# Patient Record
Sex: FEMALE | Race: WHITE
[De-identification: names, ages, dates, MRNs, and addresses within clinical notes are randomized per-mention and may not be internally consistent; named-entity substitution may affect disease eponyms.]

---

## 2024-05-14 ENCOUNTER — HOSPITAL ENCOUNTER (OUTPATIENT)
Dept: HOSPITAL 99 - RAD | Age: 34
End: 2024-05-14
Payer: COMMERCIAL

## 2024-05-14 DIAGNOSIS — R74.8: Primary | ICD-10-CM

## 2024-07-12 ENCOUNTER — TELEPHONE (OUTPATIENT)
Dept: GASTROENTEROLOGY | Facility: CLINIC | Age: 34
End: 2024-07-12

## 2024-07-12 ENCOUNTER — OFFICE VISIT (OUTPATIENT)
Dept: GASTROENTEROLOGY | Facility: CLINIC | Age: 34
End: 2024-07-12
Payer: COMMERCIAL

## 2024-07-12 VITALS
SYSTOLIC BLOOD PRESSURE: 109 MMHG | HEIGHT: 60 IN | DIASTOLIC BLOOD PRESSURE: 65 MMHG | BODY MASS INDEX: 21.48 KG/M2 | WEIGHT: 109.4 LBS

## 2024-07-12 DIAGNOSIS — R19.7 DIARRHEA, UNSPECIFIED TYPE: ICD-10-CM

## 2024-07-12 DIAGNOSIS — R10.84 GENERALIZED ABDOMINAL PAIN: Primary | ICD-10-CM

## 2024-07-12 PROCEDURE — 99244 OFF/OP CNSLTJ NEW/EST MOD 40: CPT | Performed by: INTERNAL MEDICINE

## 2024-07-12 RX ORDER — DICYCLOMINE HYDROCHLORIDE 10 MG/1
10 CAPSULE ORAL 2 TIMES DAILY
Qty: 60 CAPSULE | Refills: 2 | Status: SHIPPED | OUTPATIENT
Start: 2024-07-12

## 2024-07-12 RX ORDER — LEVONORGESTREL 19.5 MG/1
INTRAUTERINE DEVICE INTRAUTERINE
COMMUNITY

## 2024-07-12 RX ORDER — TRAZODONE HYDROCHLORIDE 50 MG/1
TABLET ORAL
COMMUNITY

## 2024-07-12 RX ORDER — ESCITALOPRAM OXALATE 10 MG/1
TABLET, FILM COATED ORAL
COMMUNITY

## 2024-07-12 RX ORDER — DEXTROAMPHETAMINE SULFATE, DEXTROAMPHETAMINE SACCHARATE, AMPHETAMINE SULFATE AND AMPHETAMINE ASPARTATE 6.25; 6.25; 6.25; 6.25 MG/1; MG/1; MG/1; MG/1
CAPSULE, EXTENDED RELEASE ORAL
COMMUNITY

## 2024-07-12 RX ORDER — AZITHROMYCIN 500 MG/1
500 TABLET, FILM COATED ORAL DAILY
COMMUNITY

## 2024-07-12 RX ORDER — DEXTROAMPHETAMINE SACCHARATE, AMPHETAMINE ASPARTATE, DEXTROAMPHETAMINE SULFATE AND AMPHETAMINE SULFATE 1.25; 1.25; 1.25; 1.25 MG/1; MG/1; MG/1; MG/1
TABLET ORAL
COMMUNITY
Start: 2024-06-19

## 2024-07-12 NOTE — PROGRESS NOTES
[facility] - Outpatient Consultation  Yudy Rodriguez 34 y.o. female MRN: [id number]  Encounter: [id number]    ASSESSMENT AND PLAN:      1. Generalized abdominal pain  This is a 34-year-old female referred to us by Dr. Henderson for 4 weeks of ongoing abdominal pain and diarrhea.  Patient states that this was started by when she had some hostile with her family.  No recent travel, antibiotics, change in meds leading up to this.    Sounds like postinfectious IBS.  Although given ongoing symptoms, may need to consider ongoing infection versus new onset celiac versus new onset IBD.  No significant family history.    For now we will start some Bentyl twice a day.  Will check stool studies and blood work.    - dicyclomine (BENTYL) 10 mg capsule; Take 1 capsule (10 mg total) by mouth 2 (two) times a day  Dispense: 60 capsule; Refill: 2    2. Diarrhea, unspecified type    -Dairy free diet for now    - Clostridium difficile toxin by PCR with EIA; Future  - Stool Enteric Bacterial Panel by PCR; Future  - CBC and differential; Future  - Calprotectin,Fecal; Future  - Celiac Disease Panel; Future  - C-reactive protein; Future    -Will plan on scheduling a colonoscopy about 4 weeks out.  I did encourage the patient if she feels better, she is okay to cancel it at that point.    - Colonoscopy; Future  - polyethylene glycol (COLYTE) 4000 mL solution; Take 4,000 mL by mouth once for 1 dose Take 4000 mL by mouth once for 1 dose. Use as directed  Dispense: 4000 mL; Refill: 0      Followup Appointment: 6 weeks  ______________________________________________________________________    Chief Complaint   Patient presents with   • Abdominal Pain     Started about a month ago, after eating pasta   • Diarrhea     Started a month ago and has been everyday after eating.        HPI:   Yudy Rodriguez is a 34 y.o. year old female who presents at the request of her PCP for abdominal pain and diarrhea.   Patient states that previously, she was overall doing well.  Occasionally she would get abdominal cramping and diarrhea when she had dairy.  Denies significant changes in meds, recent travel, antibiotics.  She had dinner with her family and had Posta georgii.  And ever since then, she has been having severe diarrhea.  Initially she was moving her bowels 4-6 times a day.  Now she has loose explosive bowel movements about 2-3 times a day.  No blood.  All associated with this she has severe lower abdominal cramping leading up to the diarrheal episode.  The cramping has improved some but still there.  She saw her primary who had some blood work done.  She had leukocytosis mildly.  Recently she was started on azithromycin.    No recent fevers or chills.  No other sick contacts.  She recently did get promoted at her job as a .  Admits that she does not eat regularly at baseline.    Historical Information   Past Medical History:   Diagnosis Date   • ADHD    • Anxiety    • Depression      Past Surgical History:   Procedure Laterality Date   • COMBINED REDUCTION MAMMAPLASTY W/ LIPOSUCTION       Social History     Substance and Sexual Activity   Alcohol Use Yes    Comment: socially     Social History     Substance and Sexual Activity   Drug Use Never     Social History     Tobacco Use   Smoking Status Never   Smokeless Tobacco Never     Family History   Problem Relation Age of Onset   • Hypertension Mother    • Diabetes type II Mother    • Hyperlipidemia Mother    • Arthritis Mother    • Addiction problem Father    • Depression Father    • Coronary artery disease Father    • ADD / ADHD Sister    • Hyperlipidemia Sister    • Colon cancer Neg Hx    • Colon polyps Neg Hx    • Crohn's disease Neg Hx    • Celiac disease Neg Hx        Meds/Allergies     Current Outpatient Medications:   •  ADDERALL XR, 25MG, 25 MG 24 hr capsule  •  amphetamine-dextroamphetamine (ADDERALL) 5 MG tablet  •  azithromycin (ZITHROMAX) 500  "MG tablet  •  dicyclomine (BENTYL) 10 mg capsule  •  levonorgestrel (Kyleena) 19.5 MG intrauterine device  •  Lexapro 10 MG tablet  •  polyethylene glycol (COLYTE) 4000 mL solution  •  traZODone (DESYREL) 50 mg tablet    No Known Allergies    PHYSICAL EXAM:    Blood pressure 109/65, height 5' (1.524 m), weight 49.6 kg (109 lb 6.4 oz). Body mass index is 21.37 kg/m².  General Appearance: NAD, cooperative, alert  Eyes: Anicteric, conjunctiva pink   ENT:  Normocephalic, atraumatic, normal mucosa.    Neck:  Supple, symmetrical, trachea midline,   Resp:  Clear to auscultation bilaterally; no rales, rhonchi or wheezing; respirations unlabored   CV:  S1 S2, Regular rate and rhythm; no murmur, rub, or gallop.  GI:  Soft, non-tender, non-distended; normal bowel sounds; no masses, no organomegaly   Rectal: Deferred  Musculoskeletal: No cyanosis, clubbing or edema. Normal ROM.  Skin:  No jaundice, rashes, or lesions   Heme/Lymph: No palpable cervical lymphadenopathy  Psych: Normal affect, good eye contact  Neuro: No gross deficits, AAOx3    Lab Results:   No results found for: \"WBC\", \"HGB\", \"HCT\", \"MCV\", \"PLT\"  No results found for: \"NA\", \"K\", \"CL\", \"CO2\", \"ANIONGAP\", \"BUN\", \"CREATININE\", \"GLUCOSE\", \"GLUF\", \"CALCIUM\", \"CORRECTEDCA\", \"AST\", \"ALT\", \"ALKPHOS\", \"PROT\", \"BILITOT\", \"EGFR\"      Radiology Results:   No results found.      REVIEW OF SYSTEMS:    CONSTITUTIONAL: Denies any fever, chills, rigors, and weight loss.  HEENT: No earache or tinnitus. Denies hearing loss or visual disturbances.  CARDIOVASCULAR: No chest pain or palpitations.   RESPIRATORY: Denies any cough, hemoptysis, shortness of breath or dyspnea on exertion.  GASTROINTESTINAL: As noted in the History of Present Illness.   GENITOURINARY: No problems with urination. Denies any hematuria or dysuria.  NEUROLOGIC: No dizziness or vertigo, denies headaches.   MUSCULOSKELETAL: Denies any muscle or joint pain.   SKIN: Denies skin rashes or itching.   ENDOCRINE: " Denies excessive thirst. Denies intolerance to heat or cold.  PSYCHOSOCIAL: Denies depression or anxiety. Denies any recent memory loss.     Answers submitted by the patient for this visit:  Abdominal Pain Questionnaire (Submitted on 7/11/2024)  Chief Complaint: Abdominal pain  Chronicity: new  Onset: 1 to 4 weeks ago  Onset quality: sudden  Frequency: intermittently  Progression since onset: gradually improving  Pain location: periumbilical region  Pain - numeric: 1/10  Pain quality: cramping  Radiates to: perineum  anorexia: No  arthralgias: No  belching: No  diarrhea: Yes  dysuria: No  fever: No  flatus: Yes  frequency: Yes  headaches: No  hematochezia: No  hematuria: No  melena: No  myalgias: No  nausea: No  weight loss: Yes  vomiting: No  Aggravated by: certain positions, eating  Relieved by: bowel movements, recumbency  Diagnostic workup: ultrasound

## 2024-07-12 NOTE — PATIENT INSTRUCTIONS
"DAIRY-FREE DIET    Lactose is the sugar that is found naturally in milk and milk products, as well as foods with ingredients such as milk or whey. In order for the body to use lactose, it has to break it down using an enzyme called lactase. Lactose intolerance is a condition that occurs when a person does not produce enough lactase to break down the lactose in food.    Symptoms of lactose intolerance include:    Abdominal cramping  Bloating  Gas  Diarrhea    These symptoms may occur shortly after eating foods that contain lactose, but sometimes will not occur until hours later. This is because people vary in the amount of lactose they can tolerate.    Avoiding Lactose    There are many different words that are used to describe the different forms that lactose may come in. Read food labels, and stay away from foods with any of the following ingredients:    Milk  Skim milk powder  Skim milk solids  Lactose  Whey  Caseinate  Curd    Reading food/nutrition labels is a very important habit to get into when looking for foods that do not contain lactose. Lactose can come in many \"hidden\" forms, and even products that do not appear to have milk included, may in fact have a milk product as an ingredient.    Lactose-Free Foods    Following are lists of lactose-free foods, and foods to avoid:    Food Group     Beverages  ENJOY: Teas, regular iced tea, regular carbonated beverages, soy milk, cocoa powder, Nestle's Quik   AVOID: Milk (all types), powdered milk, sweetened/condensed milk, instant hot cocoa, instant iced tea, Ovaltine, chocolate drink mixes, cream, half-n-half and diet soda    Breads/Cereals/Crackers   ENJOY: Sharon bread, Belgian bread, Spiritism rye bread, Italian bread, lindsey crackers, soda crackers, Ritz crackers, hot or cold cereals without added milk solids (read the label)   AVOID: Breads/rolls containing milk, prepared baking mixes (muffins, biscuits, pancakes, etc.), Zwieback, corn nuts, instant cereal with " added milk solids    Potatoes and Starches   ENJOY: Items prepared without milk or milk products: macaroni, noodles, rice, spaghetti, white/sweet potatoes   AVOID: Products with milk added, such as instant potatoes, frozen French fries, Scalloped/au gratin potatoes, macaroni and cheese mixes    Vegetables   ENJOY: Fresh, frozen, and canned vegetables without added milk products   AVOID: Creamed or breaded vegetables or vegetables with margarine added    Fruit   ENJOY: Fresh, canned or frozen fruit not processed with milk/milk products   AVOID: Any canned or frozen fruit processed with milk or milk products    Meat and Meat Substitutes   ENJOY: Plain meat, fish, poultry, eggs, Kosher prepared meat products, soybean meat substitutes, dried peas, beans, lentils, and nuts   AVOID: Breaded or creamed eggs, fish, meat or poultry, luncheon meats, sausage, hot dogs containing cheese or cheese products    Fats and Oils   ENJOY: Alexandre, shortening, Miracle Whip, milk-free margarine, diet imitation margarine, salad dressings without milk products, vegetable oils, olives, mayonnaise, Coffee Rich and Rich's Whipped Topping   AVOID: Sour cream, cream cheese, chip dips, sauces and salad dressings made with milk products and peanut butter with added milk solids    Soups/Combination Foods   ENJOY: Bouillon, broth, vegetable soups, clear, soups, consommes, homemade soups made with allowed ingredients   AVOID: Chowders, ream soups, canned and dehydrated soups containing milk products    Seasonings   ENJOY: Pure monosodium glutamate (msg), soy sauce, carob powder, olives, gravy made with water, Baker's cocoa, pure seasonings and spices, sugar, honey, corn syrup, jam, jelly, marmalade, and molasses   AVOID: Condiments with milk solids or lactose added    Desserts   ENJOY: Pancho food cake, homemade cookies, cakes or pies made from allowed ingredients, tofu desserts, pure-sugar candies, marshmallows, and gelatin   AVOID: Desserts prepared  with milk/milk products, pudding, sherbet, ice cream, custard, frozen yogurt, toffee, peppermint, butterscotch, chocolate, caramels, reduced-calorie desserts made with sugar substitute, or chewing gum made with lactose.

## 2024-07-12 NOTE — TELEPHONE ENCOUNTER
Scheduled date of colonoscopy (as of today): 8-16-24  Physician performing colonoscopy: Anmol  Location of colonoscopy: [facility]  Bowel prep reviewed with patient:Kahtie  Instructions reviewed with patient by:julieta  Clearances: no

## 2024-07-26 LAB
ADV 40+41 DNA STL QL NAA+NON-PROBE: NOT DETECTED
ASTRO TYP 1-8 RNA STL QL NAA+NON-PROBE: NOT DETECTED
BASOPHILS # BLD AUTO: 0.1 X10E3/UL (ref 0–0.2)
BASOPHILS NFR BLD AUTO: 1 %
C CAYETANENSIS DNA STL QL NAA+NON-PROBE: NOT DETECTED
C COLI+JEJ+UPSA DNA STL QL NAA+NON-PROBE: NOT DETECTED
C DIF TOX TCDA+TCDB STL QL NAA+NON-PROBE: NOT DETECTED
C DIFF TOX GENS STL QL NAA+PROBE: NEGATIVE
CALPROTECTIN STL-MCNT: 689 UG/G (ref 0–120)
CRP SERPL-MCNC: <1 MG/L (ref 0–10)
CRYPTOSP DNA STL QL NAA+NON-PROBE: NOT DETECTED
E COLI O157 DNA STL QL NAA+NON-PROBE: NORMAL
E HISTOLYT DNA STL QL NAA+NON-PROBE: NOT DETECTED
EAEC PAA PLAS AGGR+AATA ST NAA+NON-PRB: NOT DETECTED
EC STX1+STX2 GENES STL QL NAA+NON-PROBE: NOT DETECTED
ENDOMYSIUM IGA SER QL: NEGATIVE
EOSINOPHIL # BLD AUTO: 0.2 X10E3/UL (ref 0–0.4)
EOSINOPHIL NFR BLD AUTO: 2 %
EPEC EAE GENE STL QL NAA+NON-PROBE: NOT DETECTED
ERYTHROCYTE [DISTWIDTH] IN BLOOD BY AUTOMATED COUNT: 12.9 % (ref 11.7–15.4)
ETEC LTA+ST1A+ST1B TOX ST NAA+NON-PROBE: NOT DETECTED
G LAMBLIA DNA STL QL NAA+NON-PROBE: NOT DETECTED
HCT VFR BLD AUTO: 42.1 % (ref 34–46.6)
HGB BLD-MCNC: 13.6 G/DL (ref 11.1–15.9)
IGA SERPL-MCNC: 175 MG/DL (ref 87–352)
IMM GRANULOCYTES # BLD: 0 X10E3/UL (ref 0–0.1)
IMM GRANULOCYTES NFR BLD: 0 %
LYMPHOCYTES # BLD AUTO: 2.2 X10E3/UL (ref 0.7–3.1)
LYMPHOCYTES NFR BLD AUTO: 25 %
MCH RBC QN AUTO: 29.9 PG (ref 26.6–33)
MCHC RBC AUTO-ENTMCNC: 32.3 G/DL (ref 31.5–35.7)
MCV RBC AUTO: 93 FL (ref 79–97)
MONOCYTES # BLD AUTO: 0.5 X10E3/UL (ref 0.1–0.9)
MONOCYTES NFR BLD AUTO: 6 %
NEUTROPHILS # BLD AUTO: 6 X10E3/UL (ref 1.4–7)
NEUTROPHILS NFR BLD AUTO: 66 %
NOROVIRUS GI+II RNA STL QL NAA+NON-PROBE: NOT DETECTED
P SHIGELLOIDES DNA STL QL NAA+NON-PROBE: NOT DETECTED
PLATELET # BLD AUTO: 363 X10E3/UL (ref 150–450)
RBC # BLD AUTO: 4.55 X10E6/UL (ref 3.77–5.28)
RVA RNA STL QL NAA+NON-PROBE: NOT DETECTED
S ENT+BONG DNA STL QL NAA+NON-PROBE: NOT DETECTED
SAPO I+II+IV+V RNA STL QL NAA+NON-PROBE: NOT DETECTED
SHIGELLA SP+EIEC IPAH ST NAA+NON-PROBE: NOT DETECTED
TTG IGA SER-ACNC: <2 U/ML (ref 0–3)
V CHOL+PARA+VUL DNA STL QL NAA+NON-PROBE: NOT DETECTED
V CHOLERAE DNA STL QL NAA+NON-PROBE: NOT DETECTED
WBC # BLD AUTO: 9 X10E3/UL (ref 3.4–10.8)
Y ENTEROCOL DNA STL QL NAA+NON-PROBE: NOT DETECTED

## 2024-08-07 ENCOUNTER — TELEPHONE (OUTPATIENT)
Dept: GASTROENTEROLOGY | Facility: CLINIC | Age: 34
End: 2024-08-07

## 2024-08-07 NOTE — TELEPHONE ENCOUNTER
Procedure confirmed  Colonoscopy     Via: Voice mail    Instructions given: Given to Patient at Visit     Prep Given: Golytely    Call the office if there are any questions.

## 2024-08-16 ENCOUNTER — ANESTHESIA (OUTPATIENT)
Dept: GASTROENTEROLOGY | Facility: AMBULATORY SURGERY CENTER | Age: 34
End: 2024-08-16

## 2024-08-16 ENCOUNTER — ANESTHESIA EVENT (OUTPATIENT)
Dept: GASTROENTEROLOGY | Facility: AMBULATORY SURGERY CENTER | Age: 34
End: 2024-08-16

## 2024-08-16 ENCOUNTER — HOSPITAL ENCOUNTER (OUTPATIENT)
Dept: GASTROENTEROLOGY | Facility: AMBULATORY SURGERY CENTER | Age: 34
Discharge: HOME | End: 2024-08-16
Attending: INTERNAL MEDICINE
Payer: COMMERCIAL

## 2024-08-16 VITALS
HEIGHT: 60 IN | SYSTOLIC BLOOD PRESSURE: 123 MMHG | OXYGEN SATURATION: 100 % | BODY MASS INDEX: 21.4 KG/M2 | HEART RATE: 77 BPM | TEMPERATURE: 97.6 F | RESPIRATION RATE: 17 BRPM | WEIGHT: 109 LBS | DIASTOLIC BLOOD PRESSURE: 71 MMHG

## 2024-08-16 DIAGNOSIS — R19.7 DIARRHEA, UNSPECIFIED TYPE: ICD-10-CM

## 2024-08-16 LAB
EXT PREGNANCY TEST URINE: NEGATIVE
EXT. CONTROL: NORMAL

## 2024-08-16 PROCEDURE — 88305 TISSUE EXAM BY PATHOLOGIST: CPT | Performed by: PATHOLOGY

## 2024-08-16 PROCEDURE — 45380 COLONOSCOPY AND BIOPSY: CPT | Performed by: INTERNAL MEDICINE

## 2024-08-16 RX ORDER — SODIUM CHLORIDE, SODIUM LACTATE, POTASSIUM CHLORIDE, CALCIUM CHLORIDE 600; 310; 30; 20 MG/100ML; MG/100ML; MG/100ML; MG/100ML
50 INJECTION, SOLUTION INTRAVENOUS CONTINUOUS
Status: DISCONTINUED | OUTPATIENT
Start: 2024-08-16 | End: 2024-08-20 | Stop reason: HOSPADM

## 2024-08-16 RX ORDER — LIDOCAINE HYDROCHLORIDE 10 MG/ML
INJECTION, SOLUTION EPIDURAL; INFILTRATION; INTRACAUDAL; PERINEURAL AS NEEDED
Status: DISCONTINUED | OUTPATIENT
Start: 2024-08-16 | End: 2024-08-16

## 2024-08-16 RX ORDER — PROPOFOL 10 MG/ML
INJECTION, EMULSION INTRAVENOUS AS NEEDED
Status: DISCONTINUED | OUTPATIENT
Start: 2024-08-16 | End: 2024-08-16

## 2024-08-16 RX ADMIN — PROPOFOL 50 MG: 10 INJECTION, EMULSION INTRAVENOUS at 08:22

## 2024-08-16 RX ADMIN — PROPOFOL 150 MG: 10 INJECTION, EMULSION INTRAVENOUS at 08:09

## 2024-08-16 RX ADMIN — SODIUM CHLORIDE, SODIUM LACTATE, POTASSIUM CHLORIDE, CALCIUM CHLORIDE: 600; 310; 30; 20 INJECTION, SOLUTION INTRAVENOUS at 07:56

## 2024-08-16 RX ADMIN — PROPOFOL 50 MG: 10 INJECTION, EMULSION INTRAVENOUS at 08:18

## 2024-08-16 RX ADMIN — PROPOFOL 50 MG: 10 INJECTION, EMULSION INTRAVENOUS at 08:13

## 2024-08-16 RX ADMIN — LIDOCAINE HYDROCHLORIDE 50 MG: 10 INJECTION, SOLUTION EPIDURAL; INFILTRATION; INTRACAUDAL; PERINEURAL at 08:09

## 2024-08-16 NOTE — ANESTHESIA PREPROCEDURE EVALUATION
Procedure:  COLONOSCOPY    Relevant Problems   NEURO/PSYCH   (+) Anxiety   ADHD  depression     Physical Exam    Airway    Mallampati score: II  TM Distance: >3 FB  Neck ROM: full     Dental   Comment: None loose, No notable dental hx     Cardiovascular      Pulmonary      Other Findings  post-pubertal.      Anesthesia Plan  ASA Score- 2     Anesthesia Type- IV sedation with anesthesia with ASA Monitors.         Additional Monitors:     Airway Plan:     Comment: Last of PO bowel prep: 03:30  Urine hcg = negative    Patient educated on the possibility for awareness under sedation and of the possibility of airway intervention in the event of an airway or procedural emergency  .       Plan Factors-Exercise tolerance (METS): >4 METS.    Chart reviewed.    Patient summary reviewed.    Patient is not a current smoker.              Induction-     Postoperative Plan-         Informed Consent- Anesthetic plan and risks discussed with patient.  I personally reviewed this patient with the CRNA. Discussed and agreed on the Anesthesia Plan with the CRNA..

## 2024-08-16 NOTE — ANESTHESIA POSTPROCEDURE EVALUATION
Post-Op Assessment Note    CV Status:  Stable  Pain Score: 0    Pain management: adequate       Mental Status:  Sleepy and arousable   Hydration Status:  Euvolemic   PONV Controlled:  None   Airway Patency:  Patent     Post Op Vitals Reviewed: Yes    No anethesia notable event occurred.    Staff: Anesthesiologist, CRNA   Comments: report given to RN; GRIFFIN; NATTY              BP   112/62   Temp      Pulse  74   Resp   20   SpO2   100

## 2024-08-16 NOTE — H&P
History and Physical - [facility]    Yudy Rodriguez 34 y.o. female MRN: [id number]      HPI: Yudy Rodriguez is a 34 y.o. female who presents for abd pain and diarrhea.    No Known Allergies      REVIEW OF SYSTEMS: Per the HPI, and otherwise unremarkable.    Historical Information     Past Medical History:   Diagnosis Date    ADHD     Anxiety     Depression      Past Surgical History:   Procedure Laterality Date    COMBINED REDUCTION MAMMAPLASTY W/ LIPOSUCTION      WISDOM TOOTH EXTRACTION       Social History   Social History     Substance and Sexual Activity   Alcohol Use Yes    Comment: socially     Social History     Substance and Sexual Activity   Drug Use Never     Social History     Tobacco Use   Smoking Status Never   Smokeless Tobacco Never     Family History   Problem Relation Age of Onset    Hypertension Mother     Diabetes type II Mother     Hyperlipidemia Mother     Arthritis Mother     Addiction problem Father     Depression Father     Coronary artery disease Father     ADD / ADHD Sister     Hyperlipidemia Sister     Colon cancer Neg Hx     Colon polyps Neg Hx     Crohn's disease Neg Hx     Celiac disease Neg Hx        Meds/Allergies       Current Outpatient Medications:     ADDERALL XR, 25MG, 25 MG 24 hr capsule    dicyclomine (BENTYL) 10 mg capsule    Lexapro 10 MG tablet    polyethylene glycol (COLYTE) 4000 mL solution    traZODone (DESYREL) 50 mg tablet    amphetamine-dextroamphetamine (ADDERALL) 5 MG tablet    azithromycin (ZITHROMAX) 500 MG tablet    levonorgestrel (Kyleena) 19.5 MG intrauterine device    Current Facility-Administered Medications:     lactated ringers infusion, 50 mL/hr, Intravenous, Continuous        Objective     /64   Pulse 82   Temp 97.6 °F (36.4 °C) (Temporal)   Resp 20   Ht 5' (1.524 m)   Wt 49.4 kg (109 lb)   SpO2 99%   BMI 21.29 kg/m²       PHYSICAL EXAM    Gen: NAD AAOx3  Head: Normocephalic, Atraumatic  CV: S1S2 RRR  no m/r/g  CHEST: Clear b/l no c/r/w  ABD: soft, +BS NT/ND no masses  EXT: no edema      ASSESSMENT/PLAN:  This is a 34 y.o. year old female here for colonoscopy, and she is stable and optimized for her procedure.

## 2024-08-20 PROCEDURE — 88305 TISSUE EXAM BY PATHOLOGIST: CPT | Performed by: PATHOLOGY

## 2024-08-23 NOTE — RESULT ENCOUNTER NOTE
RECALL at age 46.    Nursing please let pt know that the biopsies are normal. No active colitis, inflammation, or precancerous cells. She should follow up in the office as scheduled.

## 2024-10-25 ENCOUNTER — TELEPHONE (OUTPATIENT)
Dept: GASTROENTEROLOGY | Facility: CLINIC | Age: 34
End: 2024-10-25

## 2024-10-25 NOTE — TELEPHONE ENCOUNTER
Patient questioning if she needs the follow up. She states that she feels fine and not sure if she needs to come in. Wanted Dr. Corea's opinion. Please Advise.